# Patient Record
Sex: MALE | Race: OTHER | HISPANIC OR LATINO | Employment: UNEMPLOYED | URBAN - METROPOLITAN AREA
[De-identification: names, ages, dates, MRNs, and addresses within clinical notes are randomized per-mention and may not be internally consistent; named-entity substitution may affect disease eponyms.]

---

## 2024-11-28 ENCOUNTER — APPOINTMENT (OUTPATIENT)
Dept: RADIOLOGY | Facility: MEDICAL CENTER | Age: 39
End: 2024-11-28
Attending: EMERGENCY MEDICINE
Payer: MEDICAID

## 2024-11-28 ENCOUNTER — HOSPITAL ENCOUNTER (EMERGENCY)
Facility: MEDICAL CENTER | Age: 39
End: 2024-11-28
Attending: EMERGENCY MEDICINE
Payer: MEDICAID

## 2024-11-28 VITALS
SYSTOLIC BLOOD PRESSURE: 126 MMHG | RESPIRATION RATE: 22 BRPM | HEART RATE: 79 BPM | WEIGHT: 250 LBS | DIASTOLIC BLOOD PRESSURE: 65 MMHG | BODY MASS INDEX: 35.87 KG/M2 | OXYGEN SATURATION: 88 % | TEMPERATURE: 98.6 F

## 2024-11-28 DIAGNOSIS — F11.10 OPIOID ABUSE (HCC): ICD-10-CM

## 2024-11-28 DIAGNOSIS — Z00.8 MEDICAL CLEARANCE FOR INCARCERATION: ICD-10-CM

## 2024-11-28 LAB
ALBUMIN SERPL BCP-MCNC: 4.3 G/DL (ref 3.2–4.9)
ALBUMIN/GLOB SERPL: 1.3 G/DL
ALP SERPL-CCNC: 81 U/L (ref 30–99)
ALT SERPL-CCNC: 52 U/L (ref 2–50)
AMPHET UR QL SCN: POSITIVE
ANION GAP SERPL CALC-SCNC: 10 MMOL/L (ref 7–16)
APAP SERPL-MCNC: <5 UG/ML (ref 10–30)
AST SERPL-CCNC: 39 U/L (ref 12–45)
BARBITURATES UR QL SCN: NEGATIVE
BASOPHILS # BLD AUTO: 0.6 % (ref 0–1.8)
BASOPHILS # BLD: 0.04 K/UL (ref 0–0.12)
BENZODIAZ UR QL SCN: NEGATIVE
BILIRUB SERPL-MCNC: 0.6 MG/DL (ref 0.1–1.5)
BUN SERPL-MCNC: 16 MG/DL (ref 8–22)
BZE UR QL SCN: NEGATIVE
CALCIUM ALBUM COR SERPL-MCNC: 9.1 MG/DL (ref 8.5–10.5)
CALCIUM SERPL-MCNC: 9.3 MG/DL (ref 8.5–10.5)
CANNABINOIDS UR QL SCN: POSITIVE
CHLORIDE SERPL-SCNC: 103 MMOL/L (ref 96–112)
CO2 SERPL-SCNC: 26 MMOL/L (ref 20–33)
CREAT SERPL-MCNC: 0.94 MG/DL (ref 0.5–1.4)
EOSINOPHIL # BLD AUTO: 0.12 K/UL (ref 0–0.51)
EOSINOPHIL NFR BLD: 1.7 % (ref 0–6.9)
ERYTHROCYTE [DISTWIDTH] IN BLOOD BY AUTOMATED COUNT: 41.7 FL (ref 35.9–50)
ETHANOL BLD-MCNC: <10.1 MG/DL
FENTANYL UR QL: POSITIVE
GFR SERPLBLD CREATININE-BSD FMLA CKD-EPI: 106 ML/MIN/1.73 M 2
GLOBULIN SER CALC-MCNC: 3.4 G/DL (ref 1.9–3.5)
GLUCOSE SERPL-MCNC: 104 MG/DL (ref 65–99)
HCT VFR BLD AUTO: 48.5 % (ref 42–52)
HGB BLD-MCNC: 16.1 G/DL (ref 14–18)
IMM GRANULOCYTES # BLD AUTO: 0.02 K/UL (ref 0–0.11)
IMM GRANULOCYTES NFR BLD AUTO: 0.3 % (ref 0–0.9)
LYMPHOCYTES # BLD AUTO: 0.92 K/UL (ref 1–4.8)
LYMPHOCYTES NFR BLD: 12.8 % (ref 22–41)
MCH RBC QN AUTO: 29 PG (ref 27–33)
MCHC RBC AUTO-ENTMCNC: 33.2 G/DL (ref 32.3–36.5)
MCV RBC AUTO: 87.4 FL (ref 81.4–97.8)
METHADONE UR QL SCN: NEGATIVE
MONOCYTES # BLD AUTO: 0.48 K/UL (ref 0–0.85)
MONOCYTES NFR BLD AUTO: 6.7 % (ref 0–13.4)
NEUTROPHILS # BLD AUTO: 5.62 K/UL (ref 1.82–7.42)
NEUTROPHILS NFR BLD: 77.9 % (ref 44–72)
NRBC # BLD AUTO: 0 K/UL
NRBC BLD-RTO: 0 /100 WBC (ref 0–0.2)
OPIATES UR QL SCN: NEGATIVE
OXYCODONE UR QL SCN: NEGATIVE
PCP UR QL SCN: POSITIVE
PLATELET # BLD AUTO: 206 K/UL (ref 164–446)
PMV BLD AUTO: 10.5 FL (ref 9–12.9)
POTASSIUM SERPL-SCNC: 4.7 MMOL/L (ref 3.6–5.5)
PROPOXYPH UR QL SCN: NEGATIVE
PROT SERPL-MCNC: 7.7 G/DL (ref 6–8.2)
RBC # BLD AUTO: 5.55 M/UL (ref 4.7–6.1)
SALICYLATES SERPL-MCNC: <1 MG/DL (ref 15–25)
SODIUM SERPL-SCNC: 139 MMOL/L (ref 135–145)
WBC # BLD AUTO: 7.2 K/UL (ref 4.8–10.8)

## 2024-11-28 PROCEDURE — 80179 DRUG ASSAY SALICYLATE: CPT

## 2024-11-28 PROCEDURE — 80143 DRUG ASSAY ACETAMINOPHEN: CPT

## 2024-11-28 PROCEDURE — 80053 COMPREHEN METABOLIC PANEL: CPT

## 2024-11-28 PROCEDURE — 99284 EMERGENCY DEPT VISIT MOD MDM: CPT

## 2024-11-28 PROCEDURE — 94760 N-INVAS EAR/PLS OXIMETRY 1: CPT

## 2024-11-28 PROCEDURE — 82077 ASSAY SPEC XCP UR&BREATH IA: CPT

## 2024-11-28 PROCEDURE — 85025 COMPLETE CBC W/AUTO DIFF WBC: CPT

## 2024-11-28 PROCEDURE — 36415 COLL VENOUS BLD VENIPUNCTURE: CPT

## 2024-11-28 PROCEDURE — 74018 RADEX ABDOMEN 1 VIEW: CPT

## 2024-11-28 PROCEDURE — 80307 DRUG TEST PRSMV CHEM ANLYZR: CPT

## 2024-11-29 NOTE — ED TRIAGE NOTES
.Chaim Snider III  .  Chief Complaint   Patient presents with    Drug Abuse     Patient swallow small baggy of fentanyl at approx 1600. Patient also smoked fentanyl at 1600 today. Used xanax at 1000 and meth at 0400.    Medical Clearance     For senior care     Patient BIB [facility] and [employer] with above complaints. Patient aaox 4, calm and cooperative.     ERP to see.

## 2024-11-29 NOTE — ED NOTES
Poison controlled Case # [id number]    Monitor patient 4-6 hours post ingestion.  Provided supportive care.

## 2024-11-29 NOTE — ED NOTES
RN reviewed d/c instructions w/ patient *and visitor*  including follow up and prescription information. Pt is alert and oriented. Pt  / visitor verbalized understanding of all instructions for discharge and is agreeable to plan of care. Pt is ambulatory out of ED with steady gait. PIV removed and intact.

## 2024-11-29 NOTE — ED NOTES
Patient attempted to leave. ERP aware. Patient escorted back to room.   Water and food provided.  [employer] at bedside.

## 2024-11-29 NOTE — ED PROVIDER NOTES
ED Provider Note    CHIEF COMPLAINT  Chief Complaint   Patient presents with    Drug Abuse     Patient swallow small baggy of fentanyl at approx 1600. Patient also smoked fentanyl at 1600 today. Used xanax at 1000 and meth at 0400.    Medical Clearance     For [facility]       EXTERNAL RECORDS REVIEWED  Inpatient Notes patient was last hospitalized here in 2015 he has a history of amphetamine and IV drug abuse    HPI/ROS  LIMITATION TO HISTORY   Select: : None  OUTSIDE HISTORIAN(S):  Law Enforcement states that the patient was taken into custody earlier today where he had been smoking fentanyl and then swallowed a bag which he states had fentanyl in it they did testing at the scene and were mostly consistent with acetaminophen    Chaim Snider III is a 38 y.o. male who presents to the emergency department for clearance for [facility].  The patient states he was feeling fine before today he used meth this morning and took a Xanax this morning and then used fentanyl this afternoon.  He states that PD are telling him that it was just Tylenol in the bag when I asked him he said he had used some of it earlier and smoked it and it felt like fentanyl was not as potent as he is used to but definitely had the fentanyl like effect.  He states that he swallowed the bag just because he did not want to go to [facility] and he also did not want a get sick in [facility] and withdrawal from opioids.  He did not try to hurt himself he was not trying to kill himself and he was doing well before today.    PAST MEDICAL HISTORY   has a past medical history of anxiety and Murmur heart.    SURGICAL HISTORY   has a past surgical history that includes incision and drainage orthopedic (Left, 9/17/2015).    FAMILY HISTORY  History reviewed. No pertinent family history.    SOCIAL HISTORY  Social History     Tobacco Use    Smoking status: Some Days     Types: Cigarettes    Smokeless tobacco: Not on file   Vaping Use    Vaping status: Every Day   Substance and  Sexual Activity    Alcohol use: Yes     Comment: occasional    Drug use: Yes     Types: Intravenous     Comment: meth, fentanyl, xanax    Sexual activity: Not on file       CURRENT MEDICATIONS  Home Medications       Reviewed by Mg Peter R.N. (Registered Nurse) on 11/28/24 at 1739  Med List Status: Not Addressed     Medication Last Dose Status   Naproxen Sodium (ALEVE) 220 MG Cap  Active   oxycodone-acetaminophen (PERCOCET) 7.5-325 MG per tablet  Active                    ALLERGIES  Allergies   Allergen Reactions    Pcn [Penicillins]     Penicillins        PHYSICAL EXAM  VITAL SIGNS: /89   Pulse 75   Temp 37 °C (98.6 °F) (Temporal)   Resp 16   Wt 113 kg (250 lb)   SpO2 94%   BMI 35.87 kg/m²    Pulse OX: Pulse Oxygen level is low normal on room air  Constitutional: Kind of sleepy but in no acute distress  HENT: Normocephalic, Atraumatic, MMM  Eyes: PERound. Conjunctiva normal, non-icteric.   Heart: Regular rate and rhythm, intact distal pulses   Lungs: Symmetrical movement, no resp distress   Abdomen: Non-tender, non-distended, normal bowel sounds  EXT/Back no edema or signs of infection  Skin: Warm, Dry, No erythema, No rash.   Neurologic: Alert and oriented, Grossly non-focal.       EKG/LABS  Labs Reviewed   CBC WITH DIFFERENTIAL - Abnormal; Notable for the following components:       Result Value    Neutrophils-Polys 77.90 (*)     Lymphocytes 12.80 (*)     Lymphs (Absolute) 0.92 (*)     All other components within normal limits   COMP METABOLIC PANEL - Abnormal; Notable for the following components:    Glucose 104 (*)     ALT(SGPT) 52 (*)     All other components within normal limits   ACETAMINOPHEN - Abnormal; Notable for the following components:    Acetaminophen -Tylenol <5.0 (*)     All other components within normal limits   SALICYLATE - Abnormal; Notable for the following components:    Salicylates, Quant. <1.0 (*)     All other components within normal limits   URINE DRUG SCREEN -  Abnormal; Notable for the following components:    Amphetamines Urine Positive (*)     Fentanyl, Urine Positive (*)     Phencyclidine -Pcp Positive (*)     Cannabinoid Metab Positive (*)     All other components within normal limits   DIAGNOSTIC ALCOHOL   ESTIMATED GFR       I have independently interpreted this EKG    RADIOLOGY/PROCEDURES   I have independently interpreted the diagnostic imaging associated with this visit and am waiting the final reading from the radiologist.   My preliminary interpretation is as follows:     XR abd  -no evidence of metallic foreign body    Radiologist interpretation:  [id number] VIEW   Final Result      1.  No radiopaque foreign bodies project over the field of view.   2.  Nonobstructive bowel gas pattern. Small amount of stool throughout the colon.          COURSE & MEDICAL DECISION MAKING    ASSESSMENT, COURSE AND PLAN  Care Narrative:     Patient is a 38-year-old male who presents to the emergency department potentially having swallowed a bag of fentanyl.  He is a little sleepy but does endorse using fentanyl a few hours ago.  At times his oxygen will dip to the high 80s low 90s.  Will evaluate for other coingestions because he was testing positive for acetaminophen on the bags at home we will also discussed with [facility] WikiWand but likely the patient's continue to use her for at least 12 to 24 hours in case he really did swallow a bag of fentanyl for risk of opioid overdose    DISPOSITION AND DISCUSSIONS    8:03 PM  Nursing staff discussed with poison control recommended 4 to 6 hours of observation postingestion and at this point he is approximately 4 hours and still is resting comfortably without hypoxia and exam ambulatory.    8:48 PM  Patient is resting comfortably has not had any hypoxia or respiratory distress for somnolence.  PD is now at the bedside they feel comfortable taking him custody has now been approximately 5 hours since his last ingestion.  He will be medically  cleared in a custody at this time with a urine drug screen that was positive for multiple drugs beyond fentanyl      I have discussed management of the patient with the following physicians and KERLINE's:  none    Discussion of management with other \[facility]\"" or appropriate source(s):  poison control      Escalation of care considered, and ultimately not performed:acute inpatient care management, however at this time, the patient is most appropriate for outpatient management    Barriers to care at this time, including but not limited to: Patient does not have established PCP.     Decision tools and prescription drugs considered including, but not limited to:  na .    The patient will return for new or worsening symptoms and is stable at the time of discharge.    The patient is referred to a primary physician for blood pressure management, diabetic screening, and for all other preventative health concerns.    DISPOSITION:  Patient will be discharged home in stable condition.    FOLLOW UP:  [facility], Emergency Dept  [address] [facility] [address] [id number] [phone]    If symptoms worsen      OUTPATIENT MEDICATIONS:  Discharge Medication List as of 11/28/2024  8:49 PM            FINAL DIAGNOSIS  1. Medical clearance for incarceration    2. Opioid abuse ([facility]CC)         Electronically signed by: Rach Kuo M.D., 11/28/2024 5:58 PM

## 2024-11-29 NOTE — ED NOTES
Bedside report received from off going RN/tech: Mg, assumed care of patient.  POC discussed with patient. Call light within reach, all needs addressed at this time.       Fall risk interventions in place: Move the patient closer to the nurse's station, Patient's personal possessions are with in their safe reach, Place socks on patient, Place fall risk sign on patient's door, Give patient urinal if applicable, Keep floor surfaces clean and dry, and Accompanied to restroom (all applicable per [place] Fall risk assessment)   Continuous monitoring: Cardiac Leads, Pulse Ox, or Blood Pressure  IVF/IV medications: Not Applicable   Oxygen: Room Air  Bedside sitter: Not Applicable   Isolation: Not Applicable